# Patient Record
Sex: MALE | Race: WHITE | Employment: UNEMPLOYED | URBAN - METROPOLITAN AREA
[De-identification: names, ages, dates, MRNs, and addresses within clinical notes are randomized per-mention and may not be internally consistent; named-entity substitution may affect disease eponyms.]

---

## 2023-03-02 ENCOUNTER — HOSPITAL ENCOUNTER (EMERGENCY)
Age: 28
Discharge: HOME | End: 2023-03-02

## 2023-03-02 VITALS
TEMPERATURE: 98.7 F | BODY MASS INDEX: 29.3 KG/M2 | WEIGHT: 192.68 LBS | HEART RATE: 89 BPM | SYSTOLIC BLOOD PRESSURE: 138 MMHG | RESPIRATION RATE: 18 BRPM | DIASTOLIC BLOOD PRESSURE: 90 MMHG | OXYGEN SATURATION: 100 %

## 2023-03-02 DIAGNOSIS — H60.502 ACUTE OTITIS EXTERNA OF LEFT EAR, UNSPECIFIED TYPE: Primary | ICD-10-CM

## 2023-03-02 DIAGNOSIS — L30.8 OTHER ECZEMA: ICD-10-CM

## 2023-03-02 PROCEDURE — 99283 EMERGENCY DEPT VISIT LOW MDM: CPT

## 2023-03-02 RX ORDER — TRIAMCINOLONE ACETONIDE 0.25 MG/G
OINTMENT TOPICAL
Qty: 80 G | Refills: 0 | Status: SHIPPED | OUTPATIENT
Start: 2023-03-02 | End: 2023-03-09

## 2023-03-02 RX ORDER — CIPROFLOXACIN AND DEXAMETHASONE 3; 1 MG/ML; MG/ML
4 SUSPENSION/ DROPS AURICULAR (OTIC) 2 TIMES DAILY
Qty: 7.5 ML | Refills: 0 | Status: SHIPPED | OUTPATIENT
Start: 2023-03-02 | End: 2023-03-09

## 2023-03-02 ASSESSMENT — PAIN - FUNCTIONAL ASSESSMENT: PAIN_FUNCTIONAL_ASSESSMENT: 0-10

## 2023-03-02 ASSESSMENT — LIFESTYLE VARIABLES: HOW OFTEN DO YOU HAVE A DRINK CONTAINING ALCOHOL: MONTHLY OR LESS

## 2023-03-02 ASSESSMENT — PAIN SCALES - GENERAL: PAINLEVEL_OUTOF10: 3

## 2023-03-02 ASSESSMENT — PAIN DESCRIPTION - LOCATION: LOCATION: EAR

## 2023-03-02 ASSESSMENT — PAIN DESCRIPTION - ORIENTATION: ORIENTATION: LEFT

## 2023-03-03 NOTE — ED NOTES
Patient ambulatory to lobby to depart via private vehicle     Ronald Ramirez [facility]  03/02/23 5914

## 2023-03-03 NOTE — ED PROVIDER NOTES
[address] [facility]        Pt Name: Aurora Tubbs  MRN: [id number]  [place] 1995  Date of evaluation: 3/2/2023  Provider: Mansi Stanley PA-C  PCP: Triston RESTREPO  Note Started: 7:25 PM EST 3/2/23      ELZBIETA. I have evaluated this patient. My supervising physician was available for consultation. CHIEF COMPLAINT       Chief Complaint   Patient presents with    Rash    Otalgia       HISTORY OF PRESENT ILLNESS: 1 or more Elements     History From: patient    Aurora Tubbs is a 24 y.o. male who presents complaining of left ear pain for 2 days, rash x1 week. Patient complaining of left ear pain x2 days. Denies trauma, fever, recent sinus infection, URI symptoms, discharge, recent procedure. Patient not taking medication for this. Patient also complaining of a pruritic rash that is on his back of bilateral hands, bilateral anterior elbows, back of his knees and neck x1 week. He is not taking any medications or creams for this. Rash is pruritic, has had similar in the past.  Denies fever, discharge, oral swelling, difficulty swallowing or breathing. Nursing Notes were all reviewed and agreed with or any disagreements were addressed in the HPI. REVIEW OF SYSTEMS :      Review of Systems   All other systems reviewed and are negative. Positives and Pertinent negatives as per HPI. PAST MEDICAL HISTORY    has no past medical history on file. SURGICAL HISTORY   No past surgical history on file. CURRENTMEDICATIONS       Previous Medications    NAPROXEN (NAPROSYN) 500 MG TABLET    Take 1 tablet by mouth 2 times daily       ALLERGIES     Codeine    FAMILYHISTORY     No family history on file.      SOCIAL HISTORY       Social History     Tobacco Use    Smoking status: Never   Substance Use Topics    Alcohol use: No       SCREENINGS        [employer] Coma Scale  Eye Opening: Spontaneous  Best Verbal Response: Oriented  Best Motor Response: Obeys commands  [place] Coma Scale Score: 15                CIWA Assessment  BP: (!) 157/102  Heart Rate: (!) 112           PHYSICAL EXAM  1 or more Elements     ED Triage Vitals [03/02/23 1904]   BP Temp Temp Source Heart Rate Resp SpO2 Height Weight   (!) 157/102 98.7 °F (37.1 °C) Oral (!) 112 18 100 % -- 192 lb 10.9 oz (87.4 kg)       Physical Exam  Vitals and nursing note reviewed. Constitutional:       General: He is not in acute distress. Appearance: He is not ill-appearing or toxic-appearing. HENT:      Head: Normocephalic and atraumatic. Right Ear: Tympanic membrane, ear canal and external ear normal.      Left Ear: External ear normal.      Ears:      Comments: Left canal with edema, purulent material, TM appears intact, no erythema there. No mastoid changes. Nose: Nose normal.      Mouth/Throat:      Mouth: Mucous membranes are moist.      Pharynx: Oropharynx is clear. Comments: No angioedema, mucosal lesions  Eyes:      Conjunctiva/sclera: Conjunctivae normal.   Cardiovascular:      Rate and Rhythm: Normal rate and regular rhythm. Pulses: Normal pulses. Heart sounds: Normal heart sounds. Pulmonary:      Effort: Pulmonary effort is normal. No respiratory distress. Breath sounds: Normal breath sounds. Abdominal:      General: Abdomen is flat. Bowel sounds are normal. There is no distension. Palpations: Abdomen is soft. Tenderness: There is no abdominal tenderness. There is no guarding or rebound. Musculoskeletal:         General: Normal range of motion. Cervical back: Normal range of motion and neck supple. Skin:     General: Skin is warm and dry. Capillary Refill: Capillary refill takes less than 2 seconds. Neurological:      Mental Status: He is alert and oriented to person, place, and time. Cranial Nerves: No cranial nerve deficit. Motor: No weakness.    Psychiatric:         Mood and Affect: Mood normal. Behavior: Behavior normal.           DIAGNOSTIC RESULTS   LABS:    Labs Reviewed - No data to display    When ordered only abnormal lab results are displayed. All other labs were within normal range or not returned as of this dictation. EKG: When ordered, EKG's are interpreted by the Emergency Department Physician in the absence of a cardiologist.  Please see their note for interpretation of EKG. RADIOLOGY:   Non-plain film images such as CT, Ultrasound and MRI are read by the radiologist. Plain radiographic images are visualized and preliminarily interpreted by the ED Provider with the below findings:        Interpretation per the Radiologist below, if available at the time of this note:    No orders to display     No results found. No results found. PROCEDURES   Unless otherwise noted below, none     Procedures    CRITICAL CARE TIME (.cctime)         EMERGENCY DEPARTMENT COURSE and DIFFERENTIAL DIAGNOSIS/MDM:   Vitals:    Vitals:    03/02/23 1904   BP: (!) 157/102   Pulse: (!) 112   Resp: 18   Temp: 98.7 °F (37.1 °C)   TempSrc: Oral   SpO2: 100%   Weight: 192 lb 10.9 oz (87.4 kg)       Patient was given the following medications:  Medications - No data to display          Is this patient to be included in the SEP-1 Core Measure due to severe sepsis or septic shock? No   Exclusion criteria - the patient is NOT to be included for SEP-1 Core Measure due to:  2+ SIRS criteria are not met    Chronic Conditions affecting care:    has no past medical history on file. CONSULTS: (Who and What was discussed)  None          Records Reviewed (Source):     CC/HPI Summary, DDx, ED Course, and Reassessment:   Johanna Adams is a 24 y.o. male who presents complaining of left ear pain for 2 days, rash x1 week. Patient complaining of left ear pain x2 days. Denies trauma, fever, recent sinus infection, URI symptoms, discharge, recent procedure. Patient not taking medication for this.   Patient also complaining of a pruritic rash that is on his back of bilateral hands, bilateral anterior elbows, back of his knees and neck x1 week. He is not taking any medications or creams for this. Rash is pruritic, has had similar in the past.  Denies fever, discharge, oral swelling, difficulty swallowing or breathing. On exam, appears to have uncomplicated otitis externa on the left. No mastoid changes. Rash appears like eczema. Does not appear to have secondary infections, angioedema, mucosal involvement, doubt SJS or TE N. We will give Ciprodex for left ear, Kenalog ointment for rash. Patient offered p.o. steroid, antihistamine here and declined. Instructed follow-up with primary care, return for any new or worsening symptoms. Disposition Considerations (tests considered but not done, Admit vs D/C, Shared Decision Making, Pt Expectation of Test or Tx.):        I am the Primary Clinician of Record. FINAL IMPRESSION      1. Acute otitis externa of left ear, unspecified type    2. Other eczema          DISPOSITION/PLAN     DISPOSITION Decision To Discharge 03/02/2023 07:22:31 PM      PATIENT REFERRED TO:  Flavia Phoenix [place]ck    In 2 days  Return  for any new or worsening symptoms. DISCHARGE MEDICATIONS:  New Prescriptions    CIPROFLOXACIN-DEXAMETHASONE (CIPRODEX) 0.3-0.1 % OTIC SUSPENSION    Place 4 drops into the left ear 2 times daily for 7 days    TRIAMCINOLONE (KENALOG) 0.025 % OINTMENT    Apply topically 2 times daily. DISCONTINUED MEDICATIONS:  Discontinued Medications    No medications on file              (Please note that portions of this note were completed with a voice recognition program.  Efforts were made to edit the dictations but occasionally words are mis-transcribed. )    Sravanthi Estrada PA-C (electronically signed)            Sravanthi Estrada PA-C  03/02/23 1289

## 2023-03-03 NOTE — ED TRIAGE NOTES
Patient to ED with rash all over body that started a week ago. Patient denies using new laundry or shower products. Patient also denies taking new meds or diet changes. Patient also with left ear pain for 2 days.